# Patient Record
Sex: MALE | Race: BLACK OR AFRICAN AMERICAN | URBAN - METROPOLITAN AREA
[De-identification: names, ages, dates, MRNs, and addresses within clinical notes are randomized per-mention and may not be internally consistent; named-entity substitution may affect disease eponyms.]

---

## 2018-04-20 ENCOUNTER — APPOINTMENT (RX ONLY)
Dept: URBAN - METROPOLITAN AREA TELEMEDICINE 2 | Facility: TELEMEDICINE | Age: 48
Setting detail: DERMATOLOGY
End: 2018-04-20

## 2018-04-20 DIAGNOSIS — L74.51 PRIMARY FOCAL HYPERHIDROSIS: ICD-10-CM | Status: INADEQUATELY CONTROLLED

## 2018-04-20 DIAGNOSIS — Z71.89 OTHER SPECIFIED COUNSELING: ICD-10-CM

## 2018-04-20 DIAGNOSIS — L60.8 OTHER NAIL DISORDERS: ICD-10-CM

## 2018-04-20 DIAGNOSIS — L91.8 OTHER HYPERTROPHIC DISORDERS OF THE SKIN: ICD-10-CM

## 2018-04-20 DIAGNOSIS — L72.0 EPIDERMAL CYST: ICD-10-CM

## 2018-04-20 PROBLEM — K21.9 GASTRO-ESOPHAGEAL REFLUX DISEASE WITHOUT ESOPHAGITIS: Status: ACTIVE | Noted: 2018-04-20

## 2018-04-20 PROBLEM — I10 ESSENTIAL (PRIMARY) HYPERTENSION: Status: ACTIVE | Noted: 2018-04-20

## 2018-04-20 PROBLEM — L74.519 PRIMARY FOCAL HYPERHIDROSIS, UNSPECIFIED: Status: ACTIVE | Noted: 2018-04-20

## 2018-04-20 PROBLEM — L70.0 ACNE VULGARIS: Status: ACTIVE | Noted: 2018-04-20

## 2018-04-20 PROCEDURE — ? COUNSELING

## 2018-04-20 PROCEDURE — ? PATIENT SPECIFIC COUNSELING

## 2018-04-20 PROCEDURE — ? OTHER

## 2018-04-20 PROCEDURE — ? PRESCRIPTION

## 2018-04-20 PROCEDURE — ? OBSERVATION AND MEASURE

## 2018-04-20 PROCEDURE — 99202 OFFICE O/P NEW SF 15 MIN: CPT

## 2018-04-20 RX ORDER — ALUMINUM CHLORIDE 20 %
SOLUTION, NON-ORAL TOPICAL QHS
Qty: 60 | Refills: 5 | Status: ERX | COMMUNITY
Start: 2018-04-20

## 2018-04-20 RX ADMIN — Medication: at 14:55

## 2018-04-20 ASSESSMENT — LOCATION SIMPLE DESCRIPTION DERM
LOCATION SIMPLE: LEFT AXILLARY VAULT
LOCATION SIMPLE: RIGHT AXILLARY VAULT
LOCATION SIMPLE: SCALP
LOCATION SIMPLE: RIGHT HAND
LOCATION SIMPLE: LEFT LOWER LID TARSAL MARGIN

## 2018-04-20 ASSESSMENT — LOCATION DETAILED DESCRIPTION DERM
LOCATION DETAILED: RIGHT THUMBNAIL
LOCATION DETAILED: LEFT AXILLARY VAULT
LOCATION DETAILED: RIGHT AXILLARY VAULT
LOCATION DETAILED: RIGHT SUPERIOR PARIETAL SCALP
LOCATION DETAILED: LEFT TARSAL MARGIN OF THE INFERIOR EYELID

## 2018-04-20 ASSESSMENT — LOCATION ZONE DERM
LOCATION ZONE: EYELID
LOCATION ZONE: FINGERNAIL
LOCATION ZONE: AXILLAE
LOCATION ZONE: SCALP

## 2018-04-20 NOTE — PROCEDURE: OTHER
Other (Free Text): Recommend eye specialist for removal if desired, patient states already has referral from pcp
Note Text (......Xxx Chief Complaint.): This diagnosis correlates with the
Detail Level: Generalized